# Patient Record
Sex: MALE | URBAN - METROPOLITAN AREA
[De-identification: names, ages, dates, MRNs, and addresses within clinical notes are randomized per-mention and may not be internally consistent; named-entity substitution may affect disease eponyms.]

---

## 2024-04-10 ENCOUNTER — APPOINTMENT (OUTPATIENT)
Dept: URBAN - METROPOLITAN AREA CLINIC 231 | Age: 39
Setting detail: DERMATOLOGY
End: 2024-04-11

## 2024-04-10 DIAGNOSIS — Z41.9 ENCOUNTER FOR PROCEDURE FOR PURPOSES OTHER THAN REMEDYING HEALTH STATE, UNSPECIFIED: ICD-10-CM

## 2024-04-10 PROCEDURE — OTHER COSMETIC CONSULTATION: LHR: OTHER

## 2024-04-10 ASSESSMENT — LOCATION DETAILED DESCRIPTION DERM: LOCATION DETAILED: LEFT CENTRAL LATERAL NECK

## 2024-04-10 ASSESSMENT — LOCATION SIMPLE DESCRIPTION DERM: LOCATION SIMPLE: NECK

## 2024-04-10 ASSESSMENT — LOCATION ZONE DERM: LOCATION ZONE: NECK

## 2024-05-01 ENCOUNTER — APPOINTMENT (OUTPATIENT)
Dept: URBAN - METROPOLITAN AREA CLINIC 231 | Age: 39
Setting detail: DERMATOLOGY
End: 2024-05-01

## 2024-05-01 DIAGNOSIS — Z41.9 ENCOUNTER FOR PROCEDURE FOR PURPOSES OTHER THAN REMEDYING HEALTH STATE, UNSPECIFIED: ICD-10-CM

## 2024-05-01 PROCEDURE — OTHER LASER HAIR REMOVAL: OTHER

## 2024-05-01 PROCEDURE — OTHER LIGHTSHEER: OTHER

## 2024-05-01 ASSESSMENT — LOCATION ZONE DERM: LOCATION ZONE: TRUNK

## 2024-05-01 ASSESSMENT — LOCATION DETAILED DESCRIPTION DERM
LOCATION DETAILED: RIGHT MID-UPPER BACK
LOCATION DETAILED: LEFT MEDIAL INFERIOR CHEST

## 2024-05-01 ASSESSMENT — LOCATION SIMPLE DESCRIPTION DERM
LOCATION SIMPLE: CHEST
LOCATION SIMPLE: RIGHT UPPER BACK

## 2024-05-01 NOTE — PROCEDURE: LIGHTSHEER
Speaking Coherently
Laser Type: Tatum Beal (HS Handpiece)
Hair Texture (Optional): coarse
Pulse Per Second: 2
Skin Type (Optional): II
Consent: Written consent obtained, risks reviewed including but not limited to crusting, scabbing, blistering, scarring, darker or lighter pigmentary change, paradoxical hair regrowth, incomplete removal of hair and infection.
Pulse Duration: 30 ms
Detail Level: Detailed
External Cooling Fan Speed: 0
Post-Care Instructions: I reviewed with the patient in detail post-care instructions. Patient should avoid sun for a minimum of 4 weeks before and after treatment.
Fluence: 6.7
Total Pulses: 917
Endpoint: Immediate endpoint: perifollicular erythema and edema. Vaseline and ice applied. Post care reviewed with patient.
Spot Size: 35 mm
Hair Color (Optional): Brown
Vacuum Level: low

## 2024-05-01 NOTE — PROCEDURE: LASER HAIR REMOVAL
Number Of Prepaid Treatments (Will Not Render If 0): 0
Spot Size: 9 mm
Price (Use Numbers Only, No Special Characters Or $): 0.00
Render Post-Care In The Note: No
Anesthesia Type: 1% lidocaine with epinephrine
Treatment Number: 1
Total Pulses: 459
Detail Level: Detailed
Tolerated Procedure (Optional): Tolerated Well
Post-Procedure Care: Immediate endpoint: perifollicular erythema and edema. Vaseline and ice applied. Post care reviewed with patient.
Fluence (Will Not Render If 0): 20
Cooling: chill tip
Total Pulses: 14
Shaving (Optional): The patient shaved at home
Consent: Written consent obtained, risks reviewed including but not limited to crusting, scabbing, blistering, scarring, darker or lighter pigmentary change, paradoxical hair regrowth, incomplete removal of hair and infection.
Laser Type: diode 810nm
Fluence (Will Not Render If 0): 22
Pre-Procedure: Prior to proceeding the treatment areas were cleaned and all present put on their eye protection.
Pulse Duration (Include Units): auto
Eye Shield Text: Given the treatment area eye shields were inserted prior to treatment.
Fluence (Will Not Render If 0): 23
Post-Care Instructions: I reviewed with the patient in detail post-care instructions. Patient should avoid sun for a minimum of 4 weeks before and after treatment.

## 2024-06-19 ENCOUNTER — APPOINTMENT (OUTPATIENT)
Dept: URBAN - METROPOLITAN AREA CLINIC 231 | Age: 39
Setting detail: DERMATOLOGY
End: 2024-06-20

## 2024-06-19 DIAGNOSIS — Z41.9 ENCOUNTER FOR PROCEDURE FOR PURPOSES OTHER THAN REMEDYING HEALTH STATE, UNSPECIFIED: ICD-10-CM

## 2024-06-19 PROCEDURE — OTHER LASER HAIR REMOVAL: OTHER

## 2024-06-19 PROCEDURE — OTHER LIGHTSHEER: OTHER

## 2024-06-19 ASSESSMENT — LOCATION ZONE DERM
LOCATION ZONE: NECK
LOCATION ZONE: TRUNK

## 2024-06-19 ASSESSMENT — LOCATION DETAILED DESCRIPTION DERM
LOCATION DETAILED: RIGHT POSTERIOR NECK
LOCATION DETAILED: RIGHT MID-UPPER BACK

## 2024-06-19 ASSESSMENT — LOCATION SIMPLE DESCRIPTION DERM
LOCATION SIMPLE: RIGHT UPPER BACK
LOCATION SIMPLE: POSTERIOR NECK

## 2024-06-19 NOTE — PROCEDURE: LIGHTSHEER
Detail Level: Detailed
Spot Size: 35 mm
Vacuum Level: low
Fluence: 6.7
Pulse Duration: 30 ms
Hair Color (Optional): Brown
Endpoint: Immediate endpoint: perifollicular erythema and edema. Vaseline and ice applied. Post care reviewed with patient.
Skin Type (Optional): II
External Cooling Fan Speed: 0
Total Pulses: 998
Hair Texture (Optional): coarse
Pulse Per Second: 2
Consent: Written consent obtained, risks reviewed including but not limited to crusting, scabbing, blistering, scarring, darker or lighter pigmentary change, paradoxical hair regrowth, incomplete removal of hair and infection.
Post-Care Instructions: I reviewed with the patient in detail post-care instructions. Patient should avoid sun for a minimum of 4 weeks before and after treatment.
Laser Type: Tatum Beal (HS Handpiece)

## 2024-06-19 NOTE — PROCEDURE: LASER HAIR REMOVAL
Fluence (Will Not Render If 0): 23
Location Override: posterior neck
Number Of Prepaid Treatments (Will Not Render If 0): 0
Pulse Duration (Include Units): auto
Detail Level: Detailed
Were Eye Shields Employed?: No
Consent: Written consent obtained, risks reviewed including but not limited to crusting, scabbing, blistering, scarring, darker or lighter pigmentary change, paradoxical hair regrowth, incomplete removal of hair and infection.
Treatment Number: 2
Pre-Procedure: Prior to proceeding the treatment areas were cleaned and all present put on their eye protection.
Total Pulses: 52
Tolerated Procedure (Optional): Tolerated Well
Anesthesia Type: 1% lidocaine with epinephrine
Spot Size: 9 mm
Post-Care Instructions: I reviewed with the patient in detail post-care instructions. Patient should avoid sun for a minimum of 4 weeks before and after treatment.
Post-Procedure Care: Immediate endpoint: perifollicular erythema and edema. Vaseline and ice applied. Post care reviewed with patient.
Cooling: chill tip
Total Pulses: 14
Eye Shield Text: Given the treatment area eye shields were inserted prior to treatment.
Shaving (Optional): The patient shaved at home
Laser Type: diode 810nm
Fluence (Will Not Render If 0): 19
Fluence (Will Not Render If 0): 20
Price (Use Numbers Only, No Special Characters Or $): 0.00

## 2024-06-19 NOTE — HPI: COSMETIC (LASER HAIR REMOVAL)
previous_has_had_previous_treatment
When Outside In The Sun, Do You...: always burns, tans with difficulty
When Was Your Last Laser Treatment?: 05/01/2024
Number Of Treatments: 1

## 2024-08-07 ENCOUNTER — APPOINTMENT (OUTPATIENT)
Dept: URBAN - METROPOLITAN AREA CLINIC 231 | Age: 39
Setting detail: DERMATOLOGY
End: 2024-08-07

## 2024-08-07 DIAGNOSIS — Z41.9 ENCOUNTER FOR PROCEDURE FOR PURPOSES OTHER THAN REMEDYING HEALTH STATE, UNSPECIFIED: ICD-10-CM

## 2024-08-07 PROCEDURE — OTHER LIGHTSHEER: OTHER

## 2024-08-07 PROCEDURE — OTHER LASER HAIR REMOVAL: OTHER

## 2024-08-07 ASSESSMENT — LOCATION DETAILED DESCRIPTION DERM
LOCATION DETAILED: LEFT MEDIAL SUPERIOR CHEST
LOCATION DETAILED: RIGHT MID-UPPER BACK

## 2024-08-07 ASSESSMENT — LOCATION ZONE DERM: LOCATION ZONE: TRUNK

## 2024-08-07 ASSESSMENT — LOCATION SIMPLE DESCRIPTION DERM
LOCATION SIMPLE: CHEST
LOCATION SIMPLE: RIGHT UPPER BACK

## 2024-08-07 NOTE — PROCEDURE: LIGHTSHEER
Hair Texture (Optional): coarse
Spot Size: 35 mm
Total Pulses: 998
Fluence: 6.8
Pulse Per Second: 2
Detail Level: Detailed
Hair Color (Optional): Brown
Vacuum Level: low
Consent: Written consent obtained, risks reviewed including but not limited to crusting, scabbing, blistering, scarring, darker or lighter pigmentary change, paradoxical hair regrowth, incomplete removal of hair and infection.
Post-Care Instructions: I reviewed with the patient in detail post-care instructions. Patient should avoid sun for a minimum of 4 weeks before and after treatment.
Laser Type: Tatum Beal (HS Handpiece)
Skin Type (Optional): II
Pulse Duration: 30 ms
External Cooling Fan Speed: 0
Endpoint: Immediate endpoint: perifollicular erythema and edema. Vaseline and ice applied. Post care reviewed with patient.

## 2024-08-07 NOTE — PROCEDURE: LASER HAIR REMOVAL
Spot Size: 9 mm
Treatment Number: 3
Were Eye Shields Employed?: No
Cooling: chill tip
Total Pulses: 14
Fluence (Will Not Render If 0): 20
Pre-Procedure: Prior to proceeding the treatment areas were cleaned and all present put on their eye protection.
Treatment Number: 0
Post-Procedure Care: Immediate endpoint: perifollicular erythema and edema. Vaseline and ice applied. Post care reviewed with patient.
Price (Use Numbers Only, No Special Characters Or $): 0.00
Consent: Written consent obtained, risks reviewed including but not limited to crusting, scabbing, blistering, scarring, darker or lighter pigmentary change, paradoxical hair regrowth, incomplete removal of hair and infection.
Anesthesia Type: 1% lidocaine with epinephrine
Tolerated Procedure (Optional): Tolerated Well
Detail Level: Detailed
Pulse Duration (Include Units): auto
Fluence (Will Not Render If 0): 23
Eye Shield Text: Given the treatment area eye shields were inserted prior to treatment.
Laser Type: diode 810nm
Shaving (Optional): The patient shaved at home
Post-Care Instructions: I reviewed with the patient in detail post-care instructions. Patient should avoid sun for a minimum of 4 weeks before and after treatment.
Fluence (Will Not Render If 0): 22
Total Pulses: 56

## 2024-08-07 NOTE — HPI: COSMETIC (LASER HAIR REMOVAL)
previous_has_had_previous_treatment
When Outside In The Sun, Do You...: mildly burns, tans slowly
When Was Your Last Laser Treatment?: 06/19/2024
Number Of Treatments: 2

## 2024-09-25 ENCOUNTER — APPOINTMENT (OUTPATIENT)
Dept: URBAN - METROPOLITAN AREA CLINIC 231 | Age: 39
Setting detail: DERMATOLOGY
End: 2024-09-25

## 2024-09-25 DIAGNOSIS — Z41.9 ENCOUNTER FOR PROCEDURE FOR PURPOSES OTHER THAN REMEDYING HEALTH STATE, UNSPECIFIED: ICD-10-CM

## 2024-09-25 PROCEDURE — OTHER LASER HAIR REMOVAL: OTHER

## 2024-09-25 ASSESSMENT — LOCATION ZONE DERM: LOCATION ZONE: TRUNK

## 2024-09-25 ASSESSMENT — LOCATION SIMPLE DESCRIPTION DERM
LOCATION SIMPLE: ABDOMEN
LOCATION SIMPLE: UPPER BACK

## 2024-09-25 ASSESSMENT — LOCATION DETAILED DESCRIPTION DERM
LOCATION DETAILED: INFERIOR THORACIC SPINE
LOCATION DETAILED: EPIGASTRIC SKIN

## 2024-09-25 NOTE — HPI: COSMETIC (LASER HAIR REMOVAL)
previous_has_had_previous_treatment
When Outside In The Sun, Do You...: always burns, tans with difficulty
When Was Your Last Laser Treatment?: 08/07/2024
Number Of Treatments: 3

## 2024-09-25 NOTE — PROCEDURE: LASER HAIR REMOVAL
Price (Use Numbers Only, No Special Characters Or $): 0.00
Eye Shield Text: Given the treatment area eye shields were inserted prior to treatment.
Number Of Prepaid Treatments (Will Not Render If 0): 0
Detail Level: Detailed
Fluence (Will Not Render If 0): 20
Consent: Written consent obtained, risks reviewed including but not limited to crusting, scabbing, blistering, scarring, darker or lighter pigmentary change, paradoxical hair regrowth, incomplete removal of hair and infection.
Pre-Procedure: Prior to proceeding the treatment areas were cleaned and all present put on their eye protection.
Fluence (Will Not Render If 0): 23
Anesthesia Type: 1% lidocaine with epinephrine
Cooling: chill tip
Spot Size: 9 mm
Total Pulses: 14
Post-Procedure Care: Immediate endpoint: perifollicular erythema and edema. Vaseline and ice applied. Post care reviewed with patient.
Total Pulses: 474
Render Post-Care In The Note: No
Laser Type: diode 810nm
Tolerated Procedure (Optional): Tolerated Well
Treatment Number: 4
Shaving (Optional): The patient shaved at home
Pulse Duration (Include Units): auto
Post-Care Instructions: I reviewed with the patient in detail post-care instructions. Patient should avoid sun for a minimum of 4 weeks before and after treatment.
Total Pulses: 348

## 2024-11-13 ENCOUNTER — APPOINTMENT (OUTPATIENT)
Dept: URBAN - METROPOLITAN AREA CLINIC 231 | Age: 39
Setting detail: DERMATOLOGY
End: 2024-11-14

## 2024-11-13 DIAGNOSIS — Z41.9 ENCOUNTER FOR PROCEDURE FOR PURPOSES OTHER THAN REMEDYING HEALTH STATE, UNSPECIFIED: ICD-10-CM

## 2024-11-13 PROCEDURE — OTHER LIGHTSHEER: OTHER

## 2024-11-13 PROCEDURE — OTHER LASER HAIR REMOVAL: OTHER

## 2024-11-13 ASSESSMENT — LOCATION ZONE DERM: LOCATION ZONE: TRUNK

## 2024-11-13 ASSESSMENT — LOCATION DETAILED DESCRIPTION DERM
LOCATION DETAILED: EPIGASTRIC SKIN
LOCATION DETAILED: RIGHT MID-UPPER BACK

## 2024-11-13 ASSESSMENT — LOCATION SIMPLE DESCRIPTION DERM
LOCATION SIMPLE: RIGHT UPPER BACK
LOCATION SIMPLE: ABDOMEN

## 2024-11-13 NOTE — HPI: COSMETIC (LASER HAIR REMOVAL)
previous_has_had_previous_treatment
When Outside In The Sun, Do You...: always burns, tans with difficulty
When Was Your Last Laser Treatment?: 09/25/2024
Number Of Treatments: 4

## 2024-11-13 NOTE — PROCEDURE: LIGHTSHEER
Vacuum Level: low
Post-Care Instructions: I reviewed with the patient in detail post-care instructions. Patient should avoid sun for a minimum of 4 weeks before and after treatment.
Detail Level: Detailed
Pulse Duration: 30 ms
Endpoint: Immediate endpoint: perifollicular erythema and edema. Vaseline and ice applied. Post care reviewed with patient.
Spot Size: 35 mm
Laser Type: Tatum Beal (HS Handpiece)
Consent: Written consent obtained, risks reviewed including but not limited to crusting, scabbing, blistering, scarring, darker or lighter pigmentary change, paradoxical hair regrowth, incomplete removal of hair and infection.
Hair Color (Optional): Brown
External Cooling Fan Speed: 0
Total Pulses: 998
Skin Type (Optional): II
Pulse Per Second: 2
Fluence: 6.8
Hair Texture (Optional): coarse

## 2024-11-13 NOTE — PROCEDURE: LASER HAIR REMOVAL
Cooling: chill tip
Number Of Prepaid Treatments (Will Not Render If 0): 0
Fluence (Will Not Render If 0): 20
Eye Shield Text: Given the treatment area eye shields were inserted prior to treatment.
Render Post-Care In The Note: No
Price (Use Numbers Only, No Special Characters Or $): 0.00
Fluence (Will Not Render If 0): 18
Laser Type: diode 810nm
Detail Level: Detailed
Anesthesia Type: 1% lidocaine with epinephrine
Spot Size: 9 mm
Location Override: sternum, clavicle, posterior neck
Consent: Written consent obtained, risks reviewed including but not limited to crusting, scabbing, blistering, scarring, darker or lighter pigmentary change, paradoxical hair regrowth, incomplete removal of hair and infection.
Pulse Duration (Include Units): auto
Total Pulses: 14
Pre-Procedure: Prior to proceeding the treatment areas were cleaned and all present put on their eye protection.
Post-Care Instructions: I reviewed with the patient in detail post-care instructions. Patient should avoid sun for a minimum of 4 weeks before and after treatment.
Tolerated Procedure (Optional): Tolerated Well
Treatment Number: 5
Shaving (Optional): The patient shaved at home
Total Pulses: 72
Fluence (Will Not Render If 0): 23
Post-Procedure Care: Immediate endpoint: perifollicular erythema and edema. Vaseline and ice applied. Post care reviewed with patient.

## 2024-12-12 ENCOUNTER — APPOINTMENT (OUTPATIENT)
Dept: URBAN - METROPOLITAN AREA CLINIC 231 | Age: 39
Setting detail: DERMATOLOGY
End: 2024-12-13

## 2024-12-12 DIAGNOSIS — L91.0 HYPERTROPHIC SCAR: ICD-10-CM

## 2024-12-12 PROBLEM — D48.5 NEOPLASM OF UNCERTAIN BEHAVIOR OF SKIN: Status: ACTIVE | Noted: 2024-12-12

## 2024-12-12 PROCEDURE — OTHER TREATMENT REGIMEN: OTHER

## 2024-12-12 PROCEDURE — 99203 OFFICE O/P NEW LOW 30 MIN: CPT

## 2024-12-12 PROCEDURE — OTHER MIPS QUALITY: OTHER

## 2024-12-12 PROCEDURE — OTHER DEFER: OTHER

## 2024-12-12 ASSESSMENT — LOCATION ZONE DERM: LOCATION ZONE: TRUNK

## 2024-12-12 ASSESSMENT — LOCATION SIMPLE DESCRIPTION DERM: LOCATION SIMPLE: ABDOMEN

## 2024-12-12 ASSESSMENT — LOCATION DETAILED DESCRIPTION DERM: LOCATION DETAILED: PERIUMBILICAL SKIN

## 2024-12-12 NOTE — PROCEDURE: DEFER
X Size Of Lesion In Cm (Optional): 0
Introduction Text (Please End With A Colon): The following procedure was deferred: excisional biopsy
Detail Level: Detailed
Procedure To Be Performed At Next Visit: Excision

## 2024-12-12 NOTE — PROCEDURE: TREATMENT REGIMEN
Plan: Patient is schedule for excisional biopsy with Dr. Waller 1/16/25 at 1:30pm
Detail Level: Zone

## 2024-12-12 NOTE — HPI: SKIN LESION
Has Your Skin Lesion Been Treated?: not been treated
Is This A New Presentation, Or A Follow-Up?: Skin Lesion
Additional History: Patient states that this lesion was removed about 7 years ago and it recently came back and is very bothersome.

## 2024-12-30 ENCOUNTER — APPOINTMENT (OUTPATIENT)
Dept: URBAN - METROPOLITAN AREA CLINIC 231 | Age: 39
Setting detail: DERMATOLOGY
End: 2024-12-30

## 2024-12-30 DIAGNOSIS — Z41.9 ENCOUNTER FOR PROCEDURE FOR PURPOSES OTHER THAN REMEDYING HEALTH STATE, UNSPECIFIED: ICD-10-CM

## 2024-12-30 PROCEDURE — OTHER LIGHTSHEER: OTHER

## 2024-12-30 PROCEDURE — OTHER LASER HAIR REMOVAL: OTHER

## 2024-12-30 ASSESSMENT — LOCATION SIMPLE DESCRIPTION DERM
LOCATION SIMPLE: RIGHT UPPER BACK
LOCATION SIMPLE: CHEST

## 2024-12-30 ASSESSMENT — LOCATION ZONE DERM: LOCATION ZONE: TRUNK

## 2024-12-30 ASSESSMENT — LOCATION DETAILED DESCRIPTION DERM
LOCATION DETAILED: RIGHT MID-UPPER BACK
LOCATION DETAILED: LEFT MEDIAL INFERIOR CHEST

## 2024-12-30 NOTE — PROCEDURE: LIGHTSHEER
Hair Color (Optional): Brown
Detail Level: Detailed
Spot Size: 35 mm
Fluence: 7.2
Laser Type: Tatum Beal (HS Handpiece)
Pulse Per Second: 2
Hair Texture (Optional): coarse
External Cooling Fan Speed: 0
Consent: Written consent obtained, risks reviewed including but not limited to crusting, scabbing, blistering, scarring, darker or lighter pigmentary change, paradoxical hair regrowth, incomplete removal of hair and infection.
Post-Care Instructions: I reviewed with the patient in detail post-care instructions. Patient should avoid sun for a minimum of 4 weeks before and after treatment.
Vacuum Level: low
Total Pulses: 998
Endpoint: Immediate endpoint: perifollicular erythema and edema. Vaseline and ice applied. Post care reviewed with patient.
Skin Type (Optional): II
Pulse Duration: 30 ms

## 2024-12-30 NOTE — PROCEDURE: LASER HAIR REMOVAL
Detail Level: Detailed
Price (Use Numbers Only, No Special Characters Or $): 0.00
Post-Care Instructions: I reviewed with the patient in detail post-care instructions. Patient should avoid sun for a minimum of 4 weeks before and after treatment.
Treatment Number: 6
Number Of Prepaid Treatments (Will Not Render If 0): 0
Pre-Procedure: Prior to proceeding the treatment areas were cleaned and all present put on their eye protection.
Pulse Duration (Include Units): auto
Eye Shield Text: Given the treatment area eye shields were inserted prior to treatment.
Fluence (Will Not Render If 0): 23
Total Pulses: 124
Spot Size: 9 mm
Post-Procedure Care: Immediate endpoint: perifollicular erythema and edema. Vaseline and ice applied. Post care reviewed with patient.
Fluence (Will Not Render If 0): 20
Render Post-Care In The Note: No
Tolerated Procedure (Optional): Tolerated Well
Cooling: chill tip
Laser Type: diode 810nm
Total Pulses: 14
Consent: Written consent obtained, risks reviewed including but not limited to crusting, scabbing, blistering, scarring, darker or lighter pigmentary change, paradoxical hair regrowth, incomplete removal of hair and infection.
Fluence (Will Not Render If 0): 21
Anesthesia Type: 1% lidocaine with epinephrine
Shaving (Optional): The patient shaved at home

## 2024-12-30 NOTE — HPI: COSMETIC (LASER HAIR REMOVAL)
previous_has_had_previous_treatment
When Outside In The Sun, Do You...: always burns, tans with difficulty
When Was Your Last Laser Treatment?: 11/13/2024
Number Of Treatments: 5

## 2025-01-16 ENCOUNTER — APPOINTMENT (OUTPATIENT)
Dept: URBAN - METROPOLITAN AREA CLINIC 231 | Age: 40
Setting detail: DERMATOLOGY
End: 2025-01-16

## 2025-01-16 DIAGNOSIS — L91.0 HYPERTROPHIC SCAR: ICD-10-CM

## 2025-01-16 PROBLEM — D48.5 NEOPLASM OF UNCERTAIN BEHAVIOR OF SKIN: Status: ACTIVE | Noted: 2025-01-16

## 2025-01-16 PROCEDURE — 11402 EXC TR-EXT B9+MARG 1.1-2 CM: CPT

## 2025-01-16 PROCEDURE — OTHER EXCISION: OTHER

## 2025-01-16 PROCEDURE — OTHER MIPS QUALITY: OTHER

## 2025-01-16 PROCEDURE — 12031 INTMD RPR S/A/T/EXT 2.5 CM/<: CPT

## 2025-01-16 ASSESSMENT — LOCATION ZONE DERM: LOCATION ZONE: TRUNK

## 2025-01-16 ASSESSMENT — LOCATION DETAILED DESCRIPTION DERM: LOCATION DETAILED: PERIUMBILICAL SKIN

## 2025-01-16 ASSESSMENT — LOCATION SIMPLE DESCRIPTION DERM: LOCATION SIMPLE: ABDOMEN

## 2025-01-16 NOTE — PROCEDURE: EXCISION
